# Patient Record
Sex: FEMALE
[De-identification: names, ages, dates, MRNs, and addresses within clinical notes are randomized per-mention and may not be internally consistent; named-entity substitution may affect disease eponyms.]

---

## 2019-02-25 PROBLEM — Z00.00 ENCOUNTER FOR PREVENTIVE HEALTH EXAMINATION: Status: ACTIVE | Noted: 2019-02-25

## 2019-02-27 ENCOUNTER — FORM ENCOUNTER (OUTPATIENT)
Age: 29
End: 2019-02-27

## 2019-02-28 ENCOUNTER — OUTPATIENT (OUTPATIENT)
Dept: OUTPATIENT SERVICES | Facility: HOSPITAL | Age: 29
LOS: 1 days | End: 2019-02-28
Payer: MEDICAID

## 2019-02-28 ENCOUNTER — APPOINTMENT (OUTPATIENT)
Dept: ORTHOPEDIC SURGERY | Facility: CLINIC | Age: 29
End: 2019-02-28
Payer: MEDICAID

## 2019-02-28 ENCOUNTER — APPOINTMENT (OUTPATIENT)
Dept: RADIOLOGY | Facility: CLINIC | Age: 29
End: 2019-02-28

## 2019-02-28 VITALS — HEIGHT: 61 IN | BODY MASS INDEX: 34.93 KG/M2 | WEIGHT: 185 LBS

## 2019-02-28 DIAGNOSIS — Q05.7 LUMBAR SPINA BIFIDA W/OUT HYDROCEPHALUS: ICD-10-CM

## 2019-02-28 DIAGNOSIS — M25.551 PAIN IN RIGHT HIP: ICD-10-CM

## 2019-02-28 DIAGNOSIS — Z86.69 PERSONAL HISTORY OF OTHER DISEASES OF THE NERVOUS SYSTEM AND SENSE ORGANS: ICD-10-CM

## 2019-02-28 PROCEDURE — 72190 X-RAY EXAM OF PELVIS: CPT

## 2019-02-28 PROCEDURE — 72190 X-RAY EXAM OF PELVIS: CPT | Mod: 26

## 2019-02-28 PROCEDURE — 99204 OFFICE O/P NEW MOD 45 MIN: CPT

## 2019-03-04 ENCOUNTER — OTHER (OUTPATIENT)
Age: 29
End: 2019-03-04

## 2019-03-04 RX ORDER — MELOXICAM 15 MG/1
15 TABLET ORAL
Qty: 15 | Refills: 0 | Status: ACTIVE | COMMUNITY
Start: 2019-03-04 | End: 1900-01-01

## 2019-03-18 NOTE — CONSULT LETTER
[Dear  ___] : Dear  [unfilled], [FreeTextEntry2] : Brandon Maza MD [FreeTextEntry1] : I had the pleasure of seeing your patient, Ms. EDWIN PENA today, 02/28/2019. Please see my note below.  If there are questions or concerns I will be happy to address them. Thank you for sending such a wonderful patient to me and thank you for the courtesy of this consult.\par \par As always, please feel free to reach out to me directly at any time. \par \par Sincerely, \par Marvin Miguel MD\par Orthopaedic Surgery & Sports Medicine\par  of Orthopaedic Surgery\par [facility] [employer]\par

## 2019-03-18 NOTE — HISTORY OF PRESENT ILLNESS
[de-identified] : Referring Provider: N/A\par PCP: Brandon Maza MD\par Chief Complaint: Right Hip pain\par Date of Injury/onset: 1-2 years\par \par 28 year old female with right hip pain for about 1-2 years. She states she woke up about 1-2 years ago with extreme pain to the back of her right hip she could not walk. She went to the ED where they gave her a toradol shot which only mildly helped. Walking makes the pain worse. She states that when she walks bent over the pain is better. Currently the pain feels like she "just did a workout". \par \par \par Spina Bifida- L4-L5 with urinary/bowel issue, she thinks she may have a leg length discrepcy\par \par Summary of symptoms:\par Severity of Pain:  7/10\par Character of Pain:sharp\par What and when makes pain worse: inconsistent\par Associated symptoms: n/a\par Alleviating factors: rest, seated position with elevated legs\par \par \par -------------\par \par Review of Systems:\par Constitutional:		       ENT:			                   Eyes\par Good General Health        [Yes]     Hearing Loss/Ringing            [No]        Wear Glasses/contact       [Yes]\par Recent Weight Change     [No]       Sinus Problems                     [No]        Blurred/double Vision         [No]\par Night Sweats, Fevers       [No]       Nose Bleeds                         [No]        Eye Disease or Injury         [No]\par Fatigue                              [No]       Sore Throat/Voice Change   [No]        Glaucoma                           [No]\par \par Cardiovascular		       Respiratory		                  Gastrointestinal\par Chest Pain                         [No]       Shortness of Breath             [No]       Nausea/Vomiting                  [No]\par Palpitations                        [No]       Cough                                    [No]       Abdominal Pain                    [No]\par Heart Trouble                    [No]        Wheezing/Asthma                [No]       Rectal Bleeding                    [No]\par Swelling hands/feet          [No]       Coughing up Blood                [No]       Bowel Problems                   [No]\par \par Musculoskeletal		       Neurological		                   Integumentary\par Muscle Pain or Cramps     [No]       Frequent Headaches            [No]       Change in hair or nails         [No]\par Stiffness/swelling joints   [No]       Paralysis or Tremors             [No]       Rashes or itching                [No]\par Joint Pain                           [No]      Convulsions/Seizures            [No]       Breast Lump                        [No]\par Trouble Walking                 [Yes]      Numbness/Tingling                 [No]       Breast pain or discharge    [No]\par \par Endocrine		      Hematologic/Lymphatic	                   Allergic/Immunologic\par Excessive thirst/urination  [No]     Bruise easily                          [No]        Food Allergies                      [No]\par Thyroid Disease                [No]     Slow to heal                           [No]        Aspirin Allergies                  [No]\par Hormone Problem              [No]     Enlarged glands                     [No]        Antibiotic Allergies               [No]\par \par  - Male			       - Female		                   Psychiatric\par Blood in Urine                    [No]      Blood in Urine                         [No]       Insomnia                              [No]\par Kidney Stone                     [No]      Kidney Stone                          [No]      Confusion/memory loss       [No]\par Sexual Problems                [No]      Sexual Problems                    [No]       Depression                          [No]\par Testicular Pain                   [No]       Menstrual Problems               [No]\par \par \par Please refer to the attached intake form for additional history and details. \par \par

## 2019-03-18 NOTE — DISCUSSION/SUMMARY
[de-identified] : Assessment: 28-year-old female with history of L4-L5 spina bifida with residual bilateral lower extremity weakness as well as urinary issues complains of one to 2 years of right hip pain. The patient has some signs of impingement she has a bilateral Trendelenburg gait with lower extremity weakness.\par \par Plan:\par Findings were discussed with the patient treatment options are reviewed I recommended that she try a course of oral anti-inflammatories and going to send her for physical therapy for angie-hip strengthening and core strengthening glued strengthening.\par \par Patient is also interested in follow up for her spina bifida she has not been seen in a number of years and I have recommended 2 of our pediatric orthopedic colleagues in [place] although I stated to her that they may or may not be seeing adult spina bifida patients. Additionally have given her the information for adult spina bifida clinic in the [place] and [facility]\par \par If the patient continues to have pain he can consider an MR arthrogram with possible steroid injection\par \par All questions were answered and patient and agreed with the above plan.

## 2019-03-18 NOTE — PHYSICAL EXAM
[de-identified] : General: Patient is awake and alert, demonstrates appropriate mood and affect, exhibits normal breathing and is in no acute distress.\par Constitutional: Well appearing in no apparent distress\par Skin: The skin is intact, warm, pink, and dry over the area examined.\par Lymph: There is no lymphedema\par Cardiovascular: There is brisk capillary refill in the digits of the affected extremity. They are symmetric pulses in the bilateral upper and lower extremities. \par Respiratory: The patient is in no apparent respiratory distress. They're taking full deep breaths without use of accessory muscles or evidence of audible wheezes or stridor without the use of a stethoscope. \par \par Bilateral trendelenberg gait\par Possible leg length discrepancy\par AFOs\par Motor: 5-/5 BLE\par +FADIR\par -JESUS\par -Hip Circumduction\par sensation grossly intact L2-S1 BLE\par BCR\par \par \par